# Patient Record
Sex: FEMALE | Race: WHITE
[De-identification: names, ages, dates, MRNs, and addresses within clinical notes are randomized per-mention and may not be internally consistent; named-entity substitution may affect disease eponyms.]

---

## 2020-12-23 ENCOUNTER — HOSPITAL ENCOUNTER (INPATIENT)
Dept: HOSPITAL 11 - JP.OBCHECK | Age: 20
LOS: 3 days | Discharge: HOME | End: 2020-12-26
Attending: SURGERY | Admitting: ADVANCED PRACTICE MIDWIFE
Payer: MEDICAID

## 2020-12-23 DIAGNOSIS — Z20.828: ICD-10-CM

## 2020-12-23 DIAGNOSIS — E66.9: ICD-10-CM

## 2020-12-23 DIAGNOSIS — Z3A.39: ICD-10-CM

## 2020-12-23 DIAGNOSIS — Z65.9: ICD-10-CM

## 2020-12-23 PROCEDURE — U0002 COVID-19 LAB TEST NON-CDC: HCPCS

## 2020-12-23 RX ADMIN — SODIUM CHLORIDE SCH MLS/HR: 9 INJECTION, SOLUTION INTRAVENOUS at 23:03

## 2020-12-23 NOTE — PCM.LDHP
L&D History of Present Illness





- General


Date of Service: 20


Admit Problem/Dx: 


                   Patient Status Order with Admit Dx/Problem





20 15:55


Admission Status [Patient Status] [ADT] Routine 





20 17:57


Patient Status [ADT] Routine 








                           Admission Diagnosis/Problem











Admission Diagnosis/Problem    Pregnancy

















- Related Data


Allergies/Adverse Reactions: 


                                    Allergies











Allergy/AdvReac Type Severity Reaction Status Date / Time


 


adhesive Allergy  Rash Verified 20 14:30


 


latex Allergy  Rash Verified 20 14:30


 


red (food color) Allergy  Hives Verified 20 14:31











Home Medications: 


                                    Home Meds





Pnv No.95/Ferrous Fum/Folic AC [Prenatal Vitamin Tablet] 1 tab PO DAILY 20

[History]











H&P Review of Systems





- Review of Systems:


Review Of Systems: See Below


General: Reports: No Symptoms


HEENT: Reports: No Symptoms


Pulmonary: Reports: No Symptoms


Cardiovascular: Reports: No Symptoms


Gastrointestinal: Reports: No Symptoms


Genitourinary: Reports: No Symptoms


Musculoskeletal: Reports: No Symptoms


Skin: Reports: No Symptoms


Psychiatric: Reports: No Symptoms


Neurological: Reports: No Symptoms


Hematologic/Lymphatic: Reports: No Symptoms


Immunologic: Reports: No Symptoms





L&D Exam





- Exam


Exam: See Below





- Vital Signs


Vital Signs: 


                                Last Vital Signs











Temp  35.9 C L  20 15:30


 


Pulse  131 H  20 15:30


 


Resp  18   20 15:30


 


BP  112/75   20 15:30


 


Pulse Ox  96   20 15:30














- OB Specific


Contraction Frequency (min): 0


Contraction Intensity: none


Fetal Movement: Active


Fetal Heart Tones: Present


Fetal Heart Rate (FHR) Variability: Moderate (6-25 bmp)


Birth Presentation: Vertex





- Barajas Score


Barajas Score Cervix Position: Midposition


Barajas Score Consistency: Soft


Barajas Score Effacement: 31-50%


Barajas Score Dilation: Closed


Barajas Score Infant's Station: -3


Barajas Score Total: 4





- Exam


General: Alert, Oriented, Cooperative


HEENT: PERRLA, Conjunctiva Clear, EACs Clear, EOMI, Hearing Intact, Mucosa Moist

 & Pink, Nares Patent, Normal Nasal Septum, Posterior Pharynx Clear, TMs Clear


Neck: Supple, Trachea Midline


Lungs: Clear to Auscultation, Normal Respiratory Effort


Cardiovascular: Regular Rate, Regular Rhythm


GI/Abdominal Exam: Normal Bowel Sounds, Soft, Non-Tender, No Organomegaly, No 

Distention, No Abnormal Bruit, No Mass, Pelvis Stable


Rectal Exam: Normal Exam, Normal Rectal Tone


Genitourinary: Normal external exam, Normal bimanual exam, Normal speculum exam


Back Exam: Normal Inspection, Full Range of Motion


Extremities: Normal Inspection, Normal Range of Motion, Non-Tender, No Pedal 

Edema, Normal Capillary Refill


Skin: Warm, Dry, Intact


Neurological: Cranial Nerves Intact, Reflexes Equal Bilateral


Psychiatric: Alert, Normal Affect, Normal Mood





- Patient Data


Lab Results Last 24 hrs: 


                         Laboratory Results - last 24 hr











  20 Range/Units





  14:46 14:46 15:56 


 


WBC     (4.5-11.0)  K/uL


 


RBC     (3.30-5.50)  M/uL


 


Hgb     (12.0-15.0)  g/dL


 


Hct     (36.0-48.0)  %


 


MCV     (80-98)  fL


 


MCH     (27-31)  pg


 


MCHC     (32-36)  %


 


Plt Count     (150-400)  K/uL


 


Neut % (Auto)     (36-66)  %


 


Lymph % (Auto)     (24-44)  %


 


Mono % (Auto)     (2-6)  %


 


Eos % (Auto)     (2-4)  %


 


Baso % (Auto)     (0-1)  %


 


Urine Color  Yellow    (YELLOW)  


 


Urine Appearance  Clear    (CLEAR)  


 


Urine pH  7.0    (5.0-8.0)  


 


Ur Specific Gravity  1.020    (1.008-1.030)  


 


Urine Protein  Negative    (NEGATIVE)  mg/dL


 


Urine Glucose (UA)  Negative    (NEGATIVE)  mg/dL


 


Urine Ketones  Negative    (NEGATIVE)  mg/dL


 


Urine Occult Blood  Negative    (NEGATIVE)  


 


Urine Nitrite  Negative    (NEGATIVE)  


 


Urine Bilirubin  Negative    (NEGATIVE)  


 


Urine Urobilinogen  0.2    (0.2-1.0)  EU/dL


 


Ur Leukocyte Esterase  Negative    (NEGATIVE)  


 


Urine RBC  0-5    (0-5)  


 


Urine WBC  0-5    (0-5)  


 


Ur Epithelial Cells  Many    


 


Amorphous Sediment  Occasional    


 


Urine Bacteria  Few    


 


Urine Mucus  Rare    


 


Fetal Membrane Rupture   Positive H   (NEGATIVE)  


 


Urine Opiates Screen    Negative  (NEGATIVE)  


 


Ur Oxycodone Screen    Negative  (NEGATIVE)  


 


Urine Methadone Screen    Negative  (NEGATIVE)  


 


Ur Propoxyphene Screen    Negative  (NEGATIVE)  


 


Ur Barbiturates Screen    Negative  (NEGATIVE)  


 


Ur Tricyclics Screen    Negative  (NEGATIVE)  


 


Ur Phencyclidine Scrn    Negative  (NEGATIVE)  


 


Ur Amphetamine Screen    Negative  (NEGATIVE)  


 


U Methamphetamines Scrn    Negative  (NEGATIVE)  


 


Urine MDMA Screen    Negative  (NEGATIVE)  


 


U Benzodiazepines Scrn    Negative  (NEGATIVE)  


 


U Cocaine Metab Screen    Negative  (NEGATIVE)  


 


U Marijuana (THC) Screen    Negative  (NEGATIVE)  


 


SARS CoV-2 RNA Rapid UNA     


 


Blood Type     


 


Gel Antibody Screen     














  20 Range/Units





  16:17 16:17 16:24 


 


WBC  13.6 H    (4.5-11.0)  K/uL


 


RBC  4.44    (3.30-5.50)  M/uL


 


Hgb  11.8 L    (12.0-15.0)  g/dL


 


Hct  38.3    (36.0-48.0)  %


 


MCV  86    (80-98)  fL


 


MCH  27    (27-31)  pg


 


MCHC  31 L    (32-36)  %


 


Plt Count  139 L    (150-400)  K/uL


 


Neut % (Auto)  75 H    (36-66)  %


 


Lymph % (Auto)  16 L    (24-44)  %


 


Mono % (Auto)  8 H    (2-6)  %


 


Eos % (Auto)  0 L    (2-4)  %


 


Baso % (Auto)  0    (0-1)  %


 


Urine Color     (YELLOW)  


 


Urine Appearance     (CLEAR)  


 


Urine pH     (5.0-8.0)  


 


Ur Specific Gravity     (1.008-1.030)  


 


Urine Protein     (NEGATIVE)  mg/dL


 


Urine Glucose (UA)     (NEGATIVE)  mg/dL


 


Urine Ketones     (NEGATIVE)  mg/dL


 


Urine Occult Blood     (NEGATIVE)  


 


Urine Nitrite     (NEGATIVE)  


 


Urine Bilirubin     (NEGATIVE)  


 


Urine Urobilinogen     (0.2-1.0)  EU/dL


 


Ur Leukocyte Esterase     (NEGATIVE)  


 


Urine RBC     (0-5)  


 


Urine WBC     (0-5)  


 


Ur Epithelial Cells     


 


Amorphous Sediment     


 


Urine Bacteria     


 


Urine Mucus     


 


Fetal Membrane Rupture     (NEGATIVE)  


 


Urine Opiates Screen     (NEGATIVE)  


 


Ur Oxycodone Screen     (NEGATIVE)  


 


Urine Methadone Screen     (NEGATIVE)  


 


Ur Propoxyphene Screen     (NEGATIVE)  


 


Ur Barbiturates Screen     (NEGATIVE)  


 


Ur Tricyclics Screen     (NEGATIVE)  


 


Ur Phencyclidine Scrn     (NEGATIVE)  


 


Ur Amphetamine Screen     (NEGATIVE)  


 


U Methamphetamines Scrn     (NEGATIVE)  


 


Urine MDMA Screen     (NEGATIVE)  


 


U Benzodiazepines Scrn     (NEGATIVE)  


 


U Cocaine Metab Screen     (NEGATIVE)  


 


U Marijuana (THC) Screen     (NEGATIVE)  


 


SARS CoV-2 RNA Rapid UNA    Negative  


 


Blood Type   A POSITIVE   


 


Gel Antibody Screen   Negative   











Result Diagrams: 


                                 20 16:17








- Problem List


(1) Insufficient prenatal care in third trimester


SNOMED Code(s): 0701505644640, 8192716981618


   ICD Code: O09.33 - SUPRVSN OF PREG W INSUFFICIENT ANTENAT CARE, THIRD 

TRIMESTER   Status: Acute   Current Visit: Yes   





(2) Obesity affecting pregnancy in third trimester


SNOMED Code(s): 905491713544, 110992994659


   ICD Code: O99.213 - OBESITY COMPLICATING PREGNANCY, THIRD TRIMESTER   Status:

 Acute   Current Visit: Yes   





(3) High risk pregnancy in young multigravida


SNOMED Code(s): 66864517, 366755859


   ICD Code: O09.629 - SUPERVISION OF YOUNG MULTIGRAVIDA, UNSPECIFIED TRIMESTER 

  Status: Acute   Current Visit: Yes   





(4) Social problem


SNOMED Code(s): 507674457


   ICD Code: Z65.9 - PROBLEM RELATED TO UNSPECIFIED PSYCHOSOCIAL CIRCUMSTANCES  

 Status: Acute   Current Visit: Yes   





(5) PROM (premature rupture of membranes)


SNOMED Code(s): 57368424


   ICD Code: O42.90 - ANDREIA ROM, 7TH0 BETW RUPT & ONST LABR, UNSP WEEKS OF GEST  

 Status: Acute   Current Visit: Yes   


Qualifiers: 


   PROM onset of labor timing: onset of labor more than 24 hours following 

rupture   PROM gestational age: full term   Qualified Code(s): O42.12 - Full-

term premature rupture of membranes, onset of labor more than 24 hours following

 rupture   


Problem List Initiated/Reviewed/Updated: Yes


Orders Last 24hrs: 


                               Active Orders 24 hr











 Category Date Time Status


 


 Admission Status [Patient Status] [ADT] Routine ADT  20 15:55 Active


 


 Patient Status [ADT] Routine ADT  20 17:57 Active


 


 Ambulate [RC] PER UNIT ROUTINE Care  20 17:57 Active


 


 Communication Order [RC] ASDIRECTED Care  20 17:57 Active


 


 Communication Order [RC] ASDIRECTED Care  20 18:22 Active


 


 Fetal Heart Tones [RC] PER UNIT ROUTINE Care  20 17:57 Active


 


 Fetal Non Stress Test [RC] Click to Edit Care  20 17:57 Active


 


 Insert Urinary Catheter [OM.PC] ASDIRECTED Care  20 18:30 Ordered


 


 Local Anesthetic Infusion Pump [RC] ASDIRECTED Care  20 18:22 Active


 


 Notify Provider Vital Signs [RC] PRN Care  20 17:57 Active


 


 Notify Provider [RC] PRN Care  20 17:57 Active


 


 OB Check [OM.PC] Click to Edit Care  20 14:37 Ordered


 


 PCEA Epidural [RC] ASDIRECTED Care  20 18:22 Active


 


 PCEA Epidural [RC] ASDIRECTED Care  20 18:22 Active


 


 Up ad Vangie [RC] ASDIRECTED Care  20 17:57 Active


 


 Urinary Catheter Assessment [RC] ASDIRECTED Care  20 18:22 Active


 


 VTE/DVT Education [RC] Click to Edit Care  20 17:59 Active


 


 Vital Signs [RC] PER UNIT ROUTINE Care  20 17:57 Active


 


 Consult to Case Management/ [CONS] Cons  20 17:57 Active





 Routine   


 


 Regular Diet [DIET] Diet  20 Dinner Active


 


 PATIENT RETYPE [BBK] Routine Lab  20 16:17 Results


 


 TYPE AND SCREEN [BBK] Routine Lab  20 16:17 Results


 


 Acetaminophen [TylenoL] Med  20 17:57 Active





 650 mg PO Q4H PRN   


 


 Calcium Carbonate [Tums] Med  20 17:57 Active





 1,000 mg PO Q2H PRN   


 


 Lactated Ringers [Ringers, Lactated] 1,000 ml Med  20 18:22 Active





 IV .BOLUS   


 


 Ondansetron [Zofran] Med  20 17:57 Active





 4 mg IV Q4H PRN   


 


 Penicillin G Potassium [Pfizerpen] 2.5 millunits Med  20 22:00 Active





 Sodium Chloride 0.9% [Normal Saline] 50 ml   





 IV Q4H   


 


 Sodium Chloride 0.9% [Normal Saline] 1,000 ml Med  20 17:45 Active





 IV ASDIRECTED   


 


 Sodium Chloride 0.9% [Saline Flush] Med  20 17:57 Active





 10 ml FLUSH ASDIRECTED PRN   


 


 ePHEDrine [ePHEDrine sulfate] Med  20 18:22 Active





 10 mg IVPUSH ASDIRECTED PRN   


 


 miSOPROStoL [Cytotec] Med  20 22:30 Once





 50 mcg PO ONETIME ONE   


 


 Convert IV to Saline Lock [OM.PC] Routine Oth  20 15:52 Ordered


 


 DVT/VTE Prophylaxis Reflex [OM.PC] Routine Oth  20 17:57 Ordered


 


 Epidural Catheter Management [OM.PC] Urgent Oth  20 18:22 Ordered


 


 Saline Lock Insert [OM.PC] Routine Oth  20 17:57 Ordered


 


 Resuscitation Status Routine Resus Stat  20 17:57 Ordered








                                Medication Orders





Acetaminophen (Tylenol)  650 mg PO Q4H PRN


   PRN Reason: Pain (Mild 1-3) and fever


Calcium Carbonate/Glycine (Tums)  1,000 mg PO Q2H PRN


   PRN Reason: Indigestion


Ephedrine Sulfate (Ephedrine Sulfate)  10 mg IVPUSH ASDIRECTED PRN


   PRN Reason: Hypotension


Penicillin G Potassium 2.5 (millunits/ Sodium Chloride)  50 mls @ 100 mls/hr IV 

Q4H ANUPAM


Sodium Chloride (Normal Saline)  1,000 mls @ 0 mls/hr IV ASDIRECTED ANUPAM


Lactated Ringer's (Ringers, Lactated)  1,000 mls @ 999 mls/hr IV .BOLUS ONE


   Stop: 20 19:22


Misoprostol (Cytotec)  50 mcg PO ONETIME ONE


   Stop: 20 22:31


Ondansetron HCl (Zofran)  4 mg IV Q4H PRN


   PRN Reason: Nausea/Vomiting


Sodium Chloride (Saline Flush)  10 ml FLUSH ASDIRECTED PRN


   PRN Reason: Keep Vein Open








Assessment/Plan Comment:: 





2020


19 yo  here at 39 0/7 weeks gestation


PROM around 0800 this am, was unsure what to do since no contractions so waited 

then went back to sleep and had gushing a few more times before coming into the 

hospital.  Was amnisure positive on admission, states that she moved here in 

October but was waiting on insurance to establish care.


PROM no contractions


Insufficient prenatal care


Social Problems


Obesity in pregnancy


Labs-Unknown GBS, A positive, Hep B neg, RPR nonreactive, Rubella Immune, Rapid 

COVID negative





Plan-


Continue to monitor for labor


Continue to monitor FHTs-NST every four hours with vital signs


Start oral cytotec now then another dose in four hours then Pitocin to be 

started 4 hours after that dose of oral cytotec


If no cervical change by am will plan primary  per patient request and 

will be SROM for 24 hours at that point


PCN G per protocol for unknown GBS


Pain medication per patient request


Limit cervical exams due to prolonged SROM


When you start Pitocin will need continuous monitoring then


Can be up ad vangie


Regular diet till start of Pitocin then no more food or gets epidural


IV fluids TKO unless needs hydration for procedure or FHTs


Social Service Consult

## 2020-12-23 NOTE — CRLUS
INDICATION:



Limited OB care no record



TECHNIQUE:



Ultrasound OB pelvis transabdominal.  Real-time gray-scale imaging of the 

fetus was performed as well as color Doppler and spectral Doppler analysis 

of the umbilical artery.



COMPARISON:



None available



FINDINGS:



Sonographic imaging demonstrates a single living intrauterine gestation. 

Fetus demonstrates a regular cardiac rate of 120 beats per minute. Fetus 

has a cephalic orientation. The placenta lies anterior without evidence of 

placenta previa. Amniotic fluid volume appears normal.



The following biometric measurements were obtained:



Biparietal diameter: 9.66 centimeter.



Head circumference: 34.5 centimeter.



Abdominal circumference: 35.9 centimeter.



Femur length: 7.8 centimeter.



The composite ultrasound gestational age is calculated at 39 weeks 6 days 

with an estimated sonographic due date of 12/24/2020. The fetal weight is 

estimated at 3894 grams, the greater than 90th percentile. There is 

adequate diastolic blood flow within the umbilical artery.



Overall limited fetal anatomic survey with visualization the stomach, 

kidneys, bladder and 3 vessel cord with demonstration of a four-chamber 

heart. Intracranial structures, cervix, spine, and extremities are not 

visualized due to advanced fetal age. 



IMPRESSION.:



Single live intrauterine gestation measuring 39 weeks 6 days by ultrasound 

measurements. 



Moderately limited fetal anatomic survey due to advanced fetal age.



Dictated by Juan Wright MD @ Dec 23 2020  5:11PM



Signed by Dr. Juan Wright @ Dec 23 2020  5:27PM

## 2020-12-24 PROCEDURE — 3E0P7VZ INTRODUCTION OF HORMONE INTO FEMALE REPRODUCTIVE, VIA NATURAL OR ARTIFICIAL OPENING: ICD-10-PCS | Performed by: SURGERY

## 2020-12-24 PROCEDURE — 00HU33Z INSERTION OF INFUSION DEVICE INTO SPINAL CANAL, PERCUTANEOUS APPROACH: ICD-10-PCS | Performed by: SURGERY

## 2020-12-24 PROCEDURE — 3E033VJ INTRODUCTION OF OTHER HORMONE INTO PERIPHERAL VEIN, PERCUTANEOUS APPROACH: ICD-10-PCS | Performed by: SURGERY

## 2020-12-24 PROCEDURE — 3E0R3BZ INTRODUCTION OF ANESTHETIC AGENT INTO SPINAL CANAL, PERCUTANEOUS APPROACH: ICD-10-PCS | Performed by: SURGERY

## 2020-12-24 RX ADMIN — SODIUM CHLORIDE SCH: 9 INJECTION, SOLUTION INTRAVENOUS at 07:09

## 2020-12-24 RX ADMIN — ONDANSETRON PRN MG: 2 INJECTION, SOLUTION INTRAMUSCULAR; INTRAVENOUS at 04:22

## 2020-12-24 RX ADMIN — SODIUM CHLORIDE SCH MLS/HR: 9 INJECTION, SOLUTION INTRAVENOUS at 03:12

## 2020-12-24 RX ADMIN — ONDANSETRON PRN MG: 2 INJECTION, SOLUTION INTRAMUSCULAR; INTRAVENOUS at 16:09

## 2020-12-24 NOTE — PCM.PNLD
Labor Progress Note





- VS & Meds


Vital Signs: 


                                Last Vital Signs











Temp  36.3 C   20 04:23


 


Pulse  90   20 03:33


 


Resp  16   20 03:33


 


BP  106/57 L  20 03:33


 


Pulse Ox  97   20 03:33











Active Medications: 


                               Current Medications





Acetaminophen (Tylenol)  650 mg PO Q4H PRN


   PRN Reason: Pain (Mild 1-3) and fever


Calcium Carbonate/Glycine (Tums)  1,000 mg PO Q2H PRN


   PRN Reason: Indigestion


Ephedrine Sulfate (Ephedrine Sulfate)  10 mg IVPUSH ASDIRECTED PRN


   PRN Reason: Hypotension


Penicillin G Potassium 2.5 (millunits/ Sodium Chloride)  50 mls @ 100 mls/hr IV 

Q4H [facility]


   Last Admin: 20 03:12 Dose:  100 mls/hr


   Documented by: 


Oxytocin/Sodium Chloride (Pitocin In Ns 20 Units/1,000 Ml)  20 unit in 1,000 mls

@ 6 mls/hr IV TITRATE [facility]; Protocol


   Last Titration: 20 06:25 Dose:  0 munits/min, 0 mls/hr


   Documented by: 


Lactated Ringer's (Ringers, Lactated)  1,000 mls @ 125 mls/hr IV ASDIRECTED ANUPAM


   Last Infusion: 20 05:52 Dose:  999 mls/hr


   Documented by: 


Ondansetron HCl (Zofran)  4 mg IV Q4H PRN


   PRN Reason: Nausea/Vomiting


   Last Admin: 20 04:22 Dose:  4 mg


   Documented by: 


Sodium Chloride (Saline Flush)  10 ml FLUSH ASDIRECTED PRN


   PRN Reason: Keep Vein Open





Discontinued Medications





Fentanyl (Sublimaze)  100 mcg IVPUSH ONETIME ONE


   Stop: 20 03:09


   Last Admin: 20 03:30 Dose:  100 mcg


   Documented by: 


Hydroxyzine HCl (Atarax)  100 mg PO ONETIME ONE


   Stop: 20 21:31


   Last Admin: 20 23:02 Dose:  100 mg


   Documented by: 


Penicillin G Potassium 5 (millunits/ Sodium Chloride)  50 mls @ 100 mls/hr IV 

ONETIME ONE


   Stop: 20 18:04


   Last Admin: 20 17:57 Dose:  100 mls/hr


   Documented by: 


Sodium Chloride (Normal Saline)  1,000 mls @ 0 mls/hr IV ASDIRECTED ANUPAM


Lactated Ringer's (Ringers, Lactated)  1,000 mls @ 999 mls/hr IV .BOLUS ONE


   Stop: 20 19:22


   Last Admin: 20 03:13 Dose:  Not Given


   Documented by: 


Ropivacaine (Naropin 0.2%) Confirm Administered Dose 100 mls @ as directed 

.ROUTE .STK-MED ONE


   Stop: 20 05:25


Misoprostol (Cytotec)  50 mcg PO ONETIME ONE


   Stop: 20 18:29


   Last Admin: 20 19:10 Dose:  50 mcg


   Documented by: 


Misoprostol (Cytotec)  50 mcg PO ONETIME ONE


   Stop: 20 22:31


   Last Admin: 20 23:02 Dose:  50 mcg


   Documented by: 











- Uterine Contractions


Uterine Monitoring Mode: External [place]


Contraction Frequency (min): patien reports every 3-8 minutes


Contraction Duration (sec): 40-60


Contraction Intensity: Strong


Uterine Resting Tone: Soft





- Fetal Monitoring


Fetal Heart Rate (FHR) Variability: Moderate (6-25 bmp)


Fetal Accelerations: Present, 15x15


Fetal Decelerations: Late


Fetal Strip Review: Category II





- Vaginal Exam


Dilation (cm): 2


Effacement (Percent): 80


Station: -1


Cervical Position: Midposition


Sterile Vaginal Exam Performed By: Karie Dawkins


Vaginal Exam Comment: Almost midposition.





- Labor Progress (Free Text)


Labor Progress: 





2020


Patient has been very slowly progressing all night.  Did start benjamín with 

oral cytotec then started IV pitocin, did not tolerate contractions so decision 

was made for epidural.  Patient now comfortable.  FHTs had been category one all

night now is beginning to show stress with late decelerations and one prolonged 

late deceleration.  Pitocin was shut off, O2 applied, IV bolus initiated, SVE 

unchanged from earlier exam by RN>  Decision was made to proceed with primary c-

section and patient is agreeable and wants this done.  OR crew notified and Dr. Richardson Surgeon notified.  





Plan-


Continue to monitor labor


Continue to monitor FHTs


Pitocin to remain off


Continue PCN G till we go to OR


Plan for a primary  and Dr. Richardson will assume care of patient

## 2020-12-24 NOTE — ANES
DATE OF SERVICE:  12/24/2020

 

PROCEDURE:  Labor epidural re-bolus.

 

Came in this evening to re-dose some Duramorph via the epidural for a young lady who had a C-

section earlier today that we left her labor epidural in for pain management postop.  The

patient doing overall very well.  States that the pain is very under control.  Only taking

some oral Tylenol every once in a while, mainly just having discomfort with fundus checks.

Itchiness is very minimal according to the patient, so she would like to have another re-

dose of her Duramorph today.  She had gotten 5 mg via the epidural at 8 o'clock this

morning, so I was going to give her another 5 mg dose today.  Epidural was checked.  No

signs of cerebrospinal fluid.  I then proceeded to give the patient 5 mL of sterile normal

saline with additional 5 mg of Duramorph for a total of 10 mL.  Was easily bolused in the

epidural.  The patient will be monitored closely.  The patient has continuous O2 saturation

monitoring on, and the patient is awake and not drowsy, but the nurse will keep a close eye

on her for the next 15 minutes or so.  Overall, the patient is doing very well.  Blood

pressure is good.  So again, another 5 mg of Duramorph given this evening.

 

 

 

 

Elver Markham CRNA

DD:  12/24/2020 20:00:39

DT:  12/24/2020 20:42:19

Job #:  [id number]

## 2020-12-24 NOTE — ANES
DATE OF SERVICE:  12/24/2020

 

TIME:  0530

 

I was called to the labor and delivery unit by Karie Dawkins to evaluate Ms. Donnelly

for a labor epidural.  This is her 1st baby.  She is approximately 2 cm, off Pitocin, and

having a fair amount of pain.  They would like an epidural to see if she can relax.  Risks

and benefits of the procedure were explained to the patient.  She wished to proceed with a

labor epidural.

 

TECHNIQUE:  She was placed in a sitting position.  Her back was prepped x3 with Betadine.

1% lidocaine skin local was used.  The epidural was placed at L2-3 using a 17-gauge Tuohy

needle and loss of resistance technique.  The epidural had very good feel throughout, and

the epidural space was easily identified.  There was negative CSF, negative blood, and

negative paresthesias noted.  Therefore, a catheter was threaded to 15 cm skin.  There was

negative CSF, negative blood, and negative paresthesias with the catheter.  A test dose of

1.5% lidocaine with epinephrine was given.  This test dose was negative.  The catheter was

then secured with Tegaderm and tape, and the patient was placed in a supine position.  0.2%

ropivacaine bolus of 12 mL was given.  This bolus had good relief and her vital signs

remained stable.  Therefore, 0.2% ropivacaine drip was started at 12 mL/hr.  The patient

tolerated the procedure very nicely.  Her vital signs remained stable throughout the

procedure and nurse was with me for the entire procedure.  We will continue to monitor her

throughout her labor and delivery.

 

 

 

 

Farooq Solorzano CRNA

DD:  12/24/2020 05:57:02

DT:  12/24/2020 06:24:00

Job #:  [id number]

## 2020-12-26 NOTE — DISCH
FINAL DIAGNOSIS:  Term pregnancy with failure to progress.

 

OPERATIVE PROCEDURE:   section.

 

SUMMARY:  This is a 20-year-old female presenting with term pregnancy.  On admission, she

failed to progress beyond around 2 cm, and at that point, had fetal decelerations.  These

stopped with discontinuation of the induction, and the decision was made to proceed with

 section.  This was done with an epidural anesthetic without difficulty, and at this

point, the patient and mother will be discharged home.  Followup for the mother will be with

Sari Benson PA-C, at [facility] on 2021 at 10 a.m.  Otherwise,

Karie Dawkins CNM, and the nurse and midwife staff will arrange followup with mom as

well as the baby.

 

Medications at discharge include finishing off the prenatal vitamins, Motrin 600 mg q.i.d.

p.r.n. pain #30 refill x1, and then Tylenol 1 g p.o. q.i.d. p.r.n.

 

She will be instructed to leave the incision open, avoid lifting __________ baby and car

seat for 1 month postoperatively.

 

DD:  2020 07:03:57

DT:  2020 11:51:25

Job #:  [id number]

## 2020-12-26 NOTE — PN
DATE OF SERVICE:  2020

 

The patient is postop day #1 from a  section.  Clinically, no major problems have

been noted overnight.  Urine output has been satisfactory. Pain control is good with the

epidural catheter and that will be removed today.  Continue the ibuprofen and Tylenol and

add some oxycodone p.r.n. along with some bowel stimulation.  Lo catheter will come out.

We will advance to regular diet.  Then she can get in the shower __________.

 

 

 

 

Kaveh Richardson MD

DD:  2020 06:59:23

DT:  2020 11:45:33

Job #:  [id number]

## 2021-01-13 NOTE — OR
DATE OF PROCEDURE:  2020

 

SURGEON:  Kaveh Richardson MD

 

PREOPERATIVE DIAGNOSIS:  Term pregnancy with failure to progress and transient fetal

decelerations.

 

POSTOPERATIVE DIAGNOSIS:  Term pregnancy with failure to progress and transient fetal

decelerations.

 

PROCEDURE:   section (44385).

 

ASSISTANT:  Karie Dawkins CNM.

 

ANESTHESIA:  Spinal.

 

INDICATION FOR PROCEDURE:  This is a 20-year-old, presenting with term pregnancy.  She has

been in labor for a long time and has failed to progress beyond 2 cm and then developed

fetal decelerations.  With stopping the augmentation, the fetal decelerations did stop, and

the patient is to undergo  section at this time.  Potential risks of the procedure

including bleeding, infection, injury to mother and/or baby were discussed, and the patient

wishes to proceed.

 

DETAILS OF PROCEDURE:  The patient was taken to the operating room and placed in the supine

position.  Lo catheter was inserted.  After spinal anesthetic had been placed, roll was

placed underneath the right hip.  Then, the abdomen prepped and draped.  A transverse

Pfannenstiel-type incision was made and carried down through the skin, subcutaneous tissue,

and through the anterior rectus sheath.  Rectus sheath flaps were then raised superiorly and

peritoneal reflection of the bladder on the uterus was divided and reflected downward.  A

transverse lower uterine segment incision was made and a viable male was delivered through

vertex presentation.  Baby's presentation was occiput posterior, likely accounting for her

failure to progress.  Cord was clamped and cut and routine  care given off the field

per Karie Dawkins.  Fetal membranes and placenta were then delivered without difficulty.

The patient was given IV and intrauterine Pitocin and IV cefoxitin.  Good uterine

contractions were noted.  Uterus was then closed with 2 layers of 2-0 Vicryl, stitch was

also used to reapproximate the peritoneal reflection of the bladder on the uterus, and

following this, the uterus was placed back into the pelvis.  The midline musculature

approximated with #2 Vicryl stitch as was the anterior rectus sheath and subcutaneous tissue

was irrigated with cefoxitin-containing saline solution and closed with 2 layers of 3-0 and

4-0 Vicryl stitch deep and then surgical glue.  The patient was taken to the recovery room

in satisfactory condition.

 

Per [employer] guidelines, Karie Dawkins's presence is indicated as an assistant in this case.

 

 

 

 

Kaveh Richardson MD

DD:  2021 12:16:22

DT:  2021 18:25:54

Job #:  [id number]

## 2022-07-22 ENCOUNTER — HOSPITAL ENCOUNTER (EMERGENCY)
Dept: HOSPITAL 11 - JP.ED | Age: 22
Discharge: HOME | End: 2022-07-22
Payer: MEDICAID

## 2022-07-22 DIAGNOSIS — Z91.040: ICD-10-CM

## 2022-07-22 DIAGNOSIS — S61.217A: Primary | ICD-10-CM
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
## 2022-07-22 DIAGNOSIS — E66.9: ICD-10-CM

## 2022-07-22 DIAGNOSIS — Z91.048: ICD-10-CM
